# Patient Record
(demographics unavailable — no encounter records)

---

## 2024-10-08 NOTE — ASSESSMENT
[FreeTextEntry1] : Right thigh wound and hematoma improving significantly. OK to wipe down the exudate on the hip wound after each shower and with each dressing change. Continue the medihoney. Dry dressing to absorb the drainage more distally. Can use a maxipad in a pair of bike shorts for this, in order to avoid tape damage to the surrounding skin. The left nipple soreness appears to be some breast tissue enlargement, which may be related to his prostate medications. Recommend seeing his PMD for this. Pt is seeing Dr. Peguero in 2 weeks.  Follow up here in three weeks.

## 2024-10-08 NOTE — HISTORY OF PRESENT ILLNESS
[FreeTextEntry1] : Pt is here with his wife. They are using medihoney on the right hip incision and a nexcare bandaid on the old drain site. They also have a question about some soreness of his left nipple that started recently.

## 2024-10-08 NOTE — REVIEW OF SYSTEMS
[As Noted in HPI] : as noted in HPI [Negative] : Respiratory [de-identified] : Left nipple swelling and soreness

## 2024-10-08 NOTE — PHYSICAL EXAM
[de-identified] : Alert and oriented. Walking with a walker. Thin. More mobile. [de-identified] : NL respiratory effort noted  [de-identified] : Left NAC with normal color. No erythema. Some fullness of the underlying breast tissue. [de-identified] : Right thigh hematoma is quite a bit softer. Proximal wound is healing well, with granulation tissue throughout and a small amount of epithelialization medially. The thigh skin is still discolored and the skin is dry and starting to peel. there is serosanguinous drainage from the drain site and a pinpoint hole proximal to that.

## 2024-10-11 NOTE — ADDENDUM
[FreeTextEntry1] : All medical record entries made by the Scribe were at my, Dr. Piyush Seo, direction and personally dictated by me on 10/11/2024. I have reviewed the chart and agree that the record accurately reflects my personal performance of the history, physical exam, assessment and plan. I have also personally directed, reviewed, and agreed with the chart.   I, Jhony Obrien, documented this note as a scribe on behalf of Dr. Piyush Seo on 10/11/2024.

## 2024-10-11 NOTE — REASON FOR VISIT
[Follow-Up Visit] : a follow-up visit for [Blood Count Assessment] : blood count assessment [FreeTextEntry2] : Hemolytic anemia

## 2024-10-11 NOTE — HISTORY OF PRESENT ILLNESS
[de-identified] : HPI This is a 75 y/o male with pmhx of severe mitral stenosis s/p [place]. Kiel mitral valve replacement in 1999, recurrent CVAs circa 2004 with no residual deficits, PAF  (s/p ablation in 2020), HTN, hyperlipidemia, GERD, with h/o esophageal Schatzki ring, hypothyroidism, mild renal insufficiency s/p left nephrectomy for traumatic bleeding circa 2000, overactive bladder, and memory impairment. Patient had right CRISTIAN on 6/5/24 with postop course complicated by anemia requiring a total of 4 units PRBCs, swelling to right hip, maintaining INR of 2.5-3.5 with lovenox bridge to warfarin which was changed to heparin gtt bridge. patients condition improved, and was stable for discharge on 6/10/24. Patient was sent in from [facility] on 6/11 /24 due to fevers, hypotension, tachycardia, leukocytosis. Pt currently complains of right hip pain. denies chills, sob, abdominal pain, nausea, vomiting, diarrhea, calf pain, numbness/tingling, urinary symptoms  [de-identified] : Kendell presents today for follow up and is slowly improving. Still following with ID and continues on antibiotics. Has not followed w/ cardiology outpatient yet. Surgical wound is open but is healing, seeing wound care to manage. Endorses improved appetite. Notes mild edema of R leg. Denies any blood in stools.  HGB 9.1 g/dL on 10/9/24

## 2024-10-11 NOTE — PHYSICAL EXAM
[Normal] : affect appropriate [de-identified] : anicteric [de-identified] : irregular HR- a fib [de-identified] : R leg edema

## 2024-10-11 NOTE — REVIEW OF SYSTEMS
[Diarrhea: Grade 0] : Diarrhea: Grade 0 [Joint Pain] : joint pain [Joint Stiffness] : joint stiffness [Muscle Weakness] : muscle weakness [Lower Ext Edema] : lower extremity edema [Skin Wound] : skin wound [Difficulty Walking] : difficulty walking [Nosebleeds] : no nosebleeds [Cough] : no cough [Abdominal Pain] : no abdominal pain [Vomiting] : no vomiting [Constipation] : no constipation [Skin Rash] : no skin rash [Easy Bleeding] : no tendency for easy bleeding [Easy Bruising] : no tendency for easy bruising [Swollen Glands] : no swollen glands [FreeTextEntry5] : right leg edema

## 2024-10-16 NOTE — HISTORY OF PRESENT ILLNESS
[FreeTextEntry1] :  75 year old male with extensive PMHx of severe mitral stenosis s/p [place] Kiel mitral valve replacement in 1999, recurrent CVAs circa 2004 with no residual deficits, PAF (s/p ablation in 2020), hypothyroidism, mild renal insufficiency s/p left nephrectomy for traumatic bleeding circa 2000, and memory impairment presenting with fever for the past day. Pt says that at home his wife noticed that he was subjectively warm and took his temperature which was 102F. Patient has a history of right hip CRISTIAN complicated by hematoma. Patient reports continued discomfort around his right hip surgery incision site, slight chest discomfort, chills at home. Denies headache, vision changes, diarrhea, constipation, discomfort ambulating at home, dysuria, hematuria. He reports hematoma appears smaller than last hospital visit, sym pulses, sym sensation in all extremities.   [place] [facility] [place]24, for fevers, sepsis, anemia. Pt had previous right H CRISTIAN. Previous CT of right lower extremity showed large hematoma but no evidence of extravasation. s/p 7u of pRBC, s/p hematoma drainage x2. During hospital course, orthopedic team did an aspiration of right thigh hematoma, the aspirated fluid grew -enterococcus faecalis, treated with Linezolid. Pt's right thigh wound is currently being evacuated weekly by plastic surgeon Dr. Kacey Heredia.   he was last seen at my office on 8/29/2024, and the wound appeared stable he developed a fever. He was readmitted from 9/2/24 - thru 9/21/24.  Klebsiella aerogenes was identified from blood culture. Organism is susceptible to all tested agents except for cefazolin, ampicillin sulbactam, ampicillin. s/p Right hip incision and drainage with excisional debridement, chemical debridement, exchange of modular components and placement of wound VAC. on 9/6/24  On September 6, 2024 operating room - Right hip fluid cultures are reviewed. Growth of Enterococcus faecalis-susceptible to ampicillin, susceptible to vancomycin; growth of Klebsiella Aerogenes-susceptible to aztreonam, cefepime, ceftriaxone, Cipro, ertapenem, gentamicin, imipenem, levofloxacin, meropenem, piperacillin/tazobactam, tobramycin, trimethoprim/sulfamethoxazole, resistant to ampicillin, amoxicillin/clavulanic acid, ampicillin/sulbactam, cefazolin, cefoxitin; growth Pseudomonas aeruginosa-susceptible to aztreonam, cefepime, ceftazidime, ciprofloxacin, imipenem, levofloxacin.  all cultures reviewed, susceptible to Zosyn (or Ampicillin) - CONTINUE Zosyn 4.5 grams Q 8H - effective start date is 9/10/2024 Thru 10/21/2024 - weekly CBC w diff, CMP, ESR and CRP.    Patient is here for follow-up of his wife.  He still has some small incisional wound being dressed.  There is also some drainage from his prior to LIZ drain sites on the lateral right thigh.  No fevers no chills.  He is gaining some weight slowly.  His wife mentioned that he has some rash on the chest and on his upper legs.   He reports having some itching of his skin as well.

## 2024-10-16 NOTE — PHYSICAL EXAM
[General Appearance - Alert] : alert [General Appearance - In No Acute Distress] : in no acute distress [Sclera] : the sclera and conjunctiva were normal [PERRL With Normal Accommodation] : pupils were equal in size, round, reactive to light [Extraocular Movements] : extraocular movements were intact [Outer Ear] : the ears and nose were normal in appearance [Oropharynx] : the oropharynx was normal with no thrush [Neck Appearance] : the appearance of the neck was normal [Neck Cervical Mass (___cm)] : no neck mass was observed [Jugular Venous Distention Increased] : there was no jugular-venous distention [Thyroid Diffuse Enlargement] : the thyroid was not enlarged [Respiration, Rhythm And Depth] : normal respiratory rhythm and effort [Auscultation Breath Sounds / Voice Sounds] : lungs were clear to auscultation bilaterally [Heart Rate And Rhythm] : heart rate was normal and rhythm regular [Heart Sounds] : normal S1 and S2 [Full Pulse] : the pedal pulses are present [Edema] : there was no peripheral edema [Bowel Sounds] : normal bowel sounds [Abdomen Soft] : soft [Abdomen Tenderness] : non-tender [] : no hepato-splenomegaly [Abdomen Mass (___ Cm)] : no abdominal mass palpated [Costovertebral Angle Tenderness] : no CVA tenderness [No Palpable Adenopathy] : no palpable adenopathy [Musculoskeletal - Swelling] : no joint swelling [Nail Clubbing] : no clubbing  or cyanosis of the fingernails [Motor Tone] : muscle strength and tone were normal [FreeTextEntry1] : He has since a cluster of excoriations on his upper thighs bilaterally, also on his right upper chest, [Cranial Nerves] : cranial nerves 2-12 were intact [Sensation] : the sensory exam was normal to light touch and pinprick [No Focal Deficits] : no focal deficits [Oriented To Time, Place, And Person] : oriented to person, place, and time [Affect] : the affect was normal

## 2024-10-16 NOTE — PHYSICAL EXAM
[General Appearance - Well Developed] : well developed [General Appearance - Well Nourished] : well nourished [] : no respiratory distress [Abdomen Soft] : soft [Urinary Bladder Findings] : the bladder was normal on palpation [Normal Station and Gait] : the gait and station were normal for the patient's age [No Focal Deficits] : no focal deficits [Oriented To Time, Place, And Person] : oriented to person, place, and time [FreeTextEntry1] : JOANNE: small right apex 5 mm nodule felt

## 2024-10-16 NOTE — HISTORY OF PRESENT ILLNESS
[FreeTextEntry1] :  75 year old male with extensive PMHx of severe mitral stenosis s/p [place] Kiel mitral valve replacement in 1999, recurrent CVAs circa 2004 with no residual deficits, PAF (s/p ablation in 2020), hypothyroidism, mild renal insufficiency s/p left nephrectomy for traumatic bleeding circa 2000, and memory impairment presenting with fever for the past day. Pt says that at home his wife noticed that he was subjectively warm and took his temperature which was 102F. Patient has a history of right hip CRISTIAN complicated by hematoma. Patient reports continued discomfort around his right hip surgery incision site, slight chest discomfort, chills at home. Denies headache, vision changes, diarrhea, constipation, discomfort ambulating at home, dysuria, hematuria. He reports hematoma appears smaller than last hospital visit, sym pulses, sym sensation in all extremities.   Last hospital admission to [facility] [place] was from 7/16-8/8/24, for fevers, sepsis, anemia. Pt had previous right H CRISTIAN. Previous CT of right lower extremity showed large hematoma but no evidence of extravasation. s/p 7u of pRBC, s/p hematoma drainage x2. During hospital course, orthopedic team did an aspiration of right thigh hematoma, the aspirated fluid grew -enterococcus faecalis, treated with Linezolid. Pt's right thigh wound is currently being evacuated weekly by plastic surgeon Dr. Kacey Heredia.   he was last seen at my office on 8/29/2024, and the wound appeared stable he developed a fever. He was readmitted from 9/2/24 - thru 9/21/24.  Klebsiella aerogenes was identified from blood culture. Organism is susceptible to all tested agents except for cefazolin, ampicillin sulbactam, ampicillin. s/p Right hip incision and drainage with excisional debridement, chemical debridement, exchange of modular components and placement of wound VAC. on 9/6/24  On September 6, 2024 operating room - Right hip fluid cultures are reviewed. Growth of Enterococcus faecalis-susceptible to ampicillin, susceptible to vancomycin; growth of Klebsiella Aerogenes-susceptible to aztreonam, cefepime, ceftriaxone, Cipro, ertapenem, gentamicin, imipenem, levofloxacin, meropenem, piperacillin/tazobactam, tobramycin, trimethoprim/sulfamethoxazole, resistant to ampicillin, amoxicillin/clavulanic acid, ampicillin/sulbactam, cefazolin, cefoxitin; growth Pseudomonas aeruginosa-susceptible to aztreonam, cefepime, ceftazidime, ciprofloxacin, imipenem, levofloxacin.  all cultures reviewed, susceptible to Zosyn (or Ampicillin) - CONTINUE Zosyn 4.5 grams Q 8H - effective start date is 9/10/2024 Thru 10/21/2024 - weekly CBC w diff, CMP, ESR and CRP.    Patient is here for follow-up of his wife.  He still has some small incisional wound being dressed.  There is also some drainage from his prior to LIZ drain sites on the lateral right thigh.  No fevers no chills.  He is gaining some weight slowly.  His wife mentioned that he has some rash on the chest and on his upper legs.   He reports having some itching of his skin as well.

## 2024-10-16 NOTE — REVIEW OF SYSTEMS
[As Noted in HPI] : as noted in HPI [Skin Lesions] : skin lesion [Skin Wound] : skin wound [Itching] : itching [Negative] : Heme/Lymph

## 2024-10-16 NOTE — ASSESSMENT
[FreeTextEntry1] : r/o OAB due to BPH ++ Fam Hx PCA + JOANNE PSA 3.5 Jan 2024: 4K score = 4.2 / PSA=3.89 / MRI = PS=32cc / 2 lesions: PIRADS 3 right entire transverse plane / left anterior apex / neg SV / neg LN/neg NVB PVR=29cc / patient has hip replacement coming up. also he is on coumadin for a mechanical heart valve.  July 2024: PSA=2.11 / is s/p hip replacement - now with LUTS, frequency in urination and severe nocturia 7 x per night - PVR office = 82 cc October 2024: had a hip replacement in June 2024 - has had complications after that, had a hematoma after surgery. currently on IV antibiotics at home.  sx on oxybutynin 5 mg BID and Flomax 0.4 QHS - no improvement at all. his ONLY problem is nocturia, but during the day, he doesn't urinate often and doesn't have urgency. He does have a hx of SUZANNE but could never tolerate a CPAP. office PVR=30 cc / POC urine: clean no infection  Plan make oxybutynin 10 mg ER QHS only keep flomax 0.4 all recs/guidelines for nocturia given see sleep specialist for CPAP and nocturia

## 2024-10-16 NOTE — HISTORY OF PRESENT ILLNESS
[FreeTextEntry1] : 74 yo uninephric s/p Left nephrectomy 2000, MVR, HTN, has SUZANNE but never used CPAP machine, patient with a pertinent past medical history of who presents to clinic today with complaints of LUTS.  Fam Hx: cousin and brother with PCa nocturia is the most bothersome symptom. Wakes up 5 x /night to use bathroom. Quality of his stream is good  PSA=3.54 - has a hx of prostate bx 2 years ago at [facility], was negative. SCr=1.1 IPSS: 15 / MARGARETH=9 / office PVR=19 mL Jan 2024: 4K score = 4.2 / PSA=3.89 / MRI = PS=32 cc / 2 lesions: PIRADS 3 right entire transverse plane / left anterior apex / neg SV / neg LN/neg NVB PVR=29cc / patient has hip replacement coming up. also he is on Coumadin for a mechanical heart valve.   July 2024: PSA=2.11 / is s/p hip replacement - now with LUTS, frequency in urination and severe nocturia 7 x per night - PVR office = 82 cc  October 2024: had a hip replacement in June 2024 - has had complications after that, had a hematoma after surgery. currently on IV antibiotics at home.  sx on oxybutynin 5 mg BID and Flomax 0.4 QHS - no improvement at all. his ONLY problem is nocturia, but during the day, he doesn't urinate often and doesn't have urgency. He does have a hx of SUZANNE but could never tolerate a CPAP. office PVR=30 cc / POC urine: clean no infection

## 2024-10-16 NOTE — DATA REVIEWED
[FreeTextEntry1] : Labs reviewed on [facility].  Most recent 10/9/2024 ESR of 25, C-reactive protein 15 which is decreased from 10/2/2024.

## 2024-10-16 NOTE — DATA REVIEWED
[FreeTextEntry1] : Labs reviewed on [facility].  Most recent 10/9/2024 ESR of 25, C-reactive protein 15 which is decreased from 10/2/2024.

## 2024-10-16 NOTE — ASSESSMENT
[FreeTextEntry1] : This 75-year-old man with history of [place] Kiel's mechanical valve, on long-term anticoagulation, a prior right total hip arthroplasty in June 2024 complicated by hematoma, leading to admissions in July through August 2024.  He went back to the OR for evaluation on September 6, 2024, leading to incision and drainage and excisional debridement, chemical debridement and exchange of modular components.  Fluid cultures from the right hip at that time grew out Enterococcus faecalis, Klebsiella aerogenous, and Pseudomonas aeruginosa.  He is currently on Zosyn 4.5 g every 8 hours until October 21, 2024 (start date was 9/10/2024).  After these antibiotics are done, I plan to start him on the following: Augmentin 875 mg by mouth twice a day, Levaquin 750 mg by mouth daily.  Aware of the side effect profiles of both medications, aware of his rash developing while he is on Zosyn.  The rash may be from an allergic reaction from the Zosyn.  I explained to the patient that given that he has multiple bacterial entities found on his joint fluid cultures, we do not have a singular antibiotic that could help suppress these organisms.  Continue wound care as ordered by the orthopedics team, Dr. Albrecht.  I have explained that if his numbers continue to improve (ESR/CRP/WBC) in the upcoming months, there is a chance that I can reduce the dosage of the Augmentin and the Levaquin.  He understands.  I gave them a handout regarding probiotic rich foods/fermented foods that might be helpful in supporting his intestinal health while he is on these antibiotics.  He understands.  He will have a follow-up here in about 1 month, labs to be done a few days prior to next visit.

## 2024-11-03 NOTE — ASSESSMENT
[FreeTextEntry1] : 74 y/o male for follow up   Fat necrosis and drainage of fluid is expressed from right groin Follow up in 2 weeks. All questions and concerns addressed. Plan and patient status as per Dr Heredia.

## 2024-11-03 NOTE — HISTORY OF PRESENT ILLNESS
[FreeTextEntry1] : Patient presents for follow up and states that he is having much less drainage from the areas that were draining before.  He also states that he is not using the medihoney on his wounds at all as instructed.  He states that he has less swelling of the leg and less discomfort. He continues to take Augmentin and Levaquin.  He denies fever, chills, redness or other signs of infection.

## 2024-11-03 NOTE — ADDENDUM
[FreeTextEntry1] : All medical record entries were at my direction and personally dictated by me (Dr. Kacey Heredia). I have reviewed the chart and agree that the record accurately reflects my personal performance of the history, physical exam, assessment and plan. Residual hematoma and fat necrosis noted, now that the wounds have stopped draining.  This may need to be drained/released from time to time until the collection has resolved.

## 2024-11-03 NOTE — PHYSICAL EXAM
[NI] : Normal [de-identified] : Right thigh hematoma is softer.  The skin is dark and dry and feels leathery in texture. There is a palpable collection/area of fat necrosis with a tract up to the groin area where there is a blister.   The blister is unroofed with an 18 ga needle after CHG prep, and fat necrosis and oils are released and expressed from the area.   The old drain site and one other lesion have no drainage on the dressings that are in place.

## 2024-11-03 NOTE — ASSESSMENT
[FreeTextEntry1] : 76 y/o male for follow up   Fat necrosis and drainage of fluid is expressed from right groin Follow up in 2 weeks. All questions and concerns addressed. Plan and patient status as per Dr Heredia.

## 2024-11-03 NOTE — ADDENDUM
I tried calling mom and left her a message that Dr. Crisostomo said she should try to stay in the state for his neurology.  I left her a message since she didn't answer.  I will not be here tomorrow so if she calls.  He said we can try someone in [facility] or [place].  We need to try to stay in the state because of insurance.   [FreeTextEntry1] : All medical record entries were at my direction and personally dictated by me (Dr. Kacey Heredia). I have reviewed the chart and agree that the record accurately reflects my personal performance of the history, physical exam, assessment and plan. Residual hematoma and fat necrosis noted, now that the wounds have stopped draining.  This may need to be drained/released from time to time until the collection has resolved.

## 2024-11-03 NOTE — PHYSICAL EXAM
[NI] : Normal [de-identified] : Right thigh hematoma is softer.  The skin is dark and dry and feels leathery in texture. There is a palpable collection/area of fat necrosis with a tract up to the groin area where there is a blister.   The blister is unroofed with an 18 ga needle after CHG prep, and fat necrosis and oils are released and expressed from the area.   The old drain site and one other lesion have no drainage on the dressings that are in place.

## 2024-11-07 NOTE — PHYSICAL EXAM
[Ambulatory and capable of all self care but unable to carry out any work activities] : Status 2- Ambulatory and capable of all self care but unable to carry out any work activities. Up and about more than 50% of waking hours [Thin] : thin [Normal] : affect appropriate [de-identified] : anicteric [de-identified] : irregular HR- a fib [de-identified] : R leg edema 1+ pitting [de-identified] : altered gait- uses cane

## 2024-11-07 NOTE — RESULTS/DATA
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

## 2024-11-07 NOTE — REVIEW OF SYSTEMS
[Chills] : no chills [Fatigue] : no fatigue [Nosebleeds] : no nosebleeds [Palpitations] : no palpitations [Lower Ext Edema] : lower extremity edema [Cough] : no cough [Abdominal Pain] : no abdominal pain [Vomiting] : no vomiting [Constipation] : no constipation [Diarrhea: Grade 0] : Diarrhea: Grade 0 [Joint Pain] : joint pain [Joint Stiffness] : joint stiffness [Muscle Weakness] : muscle weakness [Skin Rash] : no skin rash [Skin Wound] : skin wound [Difficulty Walking] : difficulty walking [Easy Bleeding] : no tendency for easy bleeding [Easy Bruising] : no tendency for easy bruising [Swollen Glands] : no swollen glands [FreeTextEntry5] : right leg edema [de-identified] : healing

## 2024-11-07 NOTE — REASON FOR VISIT
[Follow-Up Visit] : a follow-up visit for [Blood Count Assessment] : blood count assessment [Spouse] : spouse [FreeTextEntry2] : Hemolytic anemia

## 2024-11-07 NOTE — HISTORY OF PRESENT ILLNESS
[de-identified] : HPI This is a 73 y/o male with pmhx of severe mitral stenosis s/p [place]. Kiel mitral valve replacement in 1999, recurrent CVAs circa 2004 with no residual deficits, PAF  (s/p ablation in 2020), HTN, hyperlipidemia, GERD, with h/o esophageal Schatzki ring, hypothyroidism, mild renal insufficiency s/p left nephrectomy for traumatic bleeding circa 2000, overactive bladder, and memory impairment. Patient had right CRISTIAN on 6/5/24 with postop course complicated by anemia requiring a total of 4 units PRBCs, swelling to right hip, maintaining INR of 2.5-3.5 with lovenox bridge to warfarin which was changed to heparin gtt bridge. patients condition improved, and was stable for discharge on 6/10/24. Patient was sent in from [facility] on 6/11 /24 due to fevers, hypotension, tachycardia, leukocytosis. Pt currently complains of right hip pain. denies chills, sob, abdominal pain, nausea, vomiting, diarrhea, calf pain, numbness/tingling, urinary symptoms  [de-identified] : Kendell presents today for follow up. Wife reports he had a fall last week, went to ED for CT scan of head which was (-). Still following with ID and continues on antibiotics.  HGB 11.8 today

## 2024-11-13 NOTE — HISTORY OF PRESENT ILLNESS
[FreeTextEntry1] : A 75-year-old male with a complex medical history including mitral valve replacement, remote CVAs, atrial fibrillation ablation, hypothyroidism, mild renal insufficiency after nephrectomy, and memory impairment presents with a one-day history of fever. He also reports continued discomfort around his right hip incision site from a recent CRISTIAN complicated by a recurrent infected hematoma. He was recently hospitalized twice for this issue: once in July/August for fevers, sepsis, and anemia requiring blood transfusions and hematoma drainage (treated with linezolid for Enterococcus faecalis in the aspirate), and again in September for Klebsiella aerogenes and Enterococcus faecalis bacteremia requiring surgical debridement and component exchange. Cultures from the September I&D also grew Pseudomonas aeruginosa. He was discharged on a prolonged course of Zosyn.   During the October 16, 2024 visit, he was afebrile and without chills, had some residual drainage from the right thigh, and had a n ew rash on his chest and upper legs.  He was to completed Zosyn 4.5 g every 8 hours until October 21, 2024 (start date was 9/10/2024). Then transition to: Augmentin 875 mg by mouth twice a day, Levaquin 750 mg by mouth daily.  The rash may be from an allergic reaction from the Zosyn. I explained to the patient that given that he has multiple bacterial entities found on his joint fluid cultures, we do not have a singular antibiotic that could help suppress these organisms. Continue wound care as ordered by the orthopedics team, Dr. Albrecht.  I have explained that if his numbers continue to improve (ESR/CRP/WBC) in the upcoming months, there is a chance that I can reduce the dosage of the Augmentin and the Levaquin.  The last visit, patient is eating better.  He is here with his wife.  He is gaining some weight and sleeping little bit better.  It still takes a lot of energy to move.  But he is moving about.  He has no diarrhea, he is moving his bowels.  They did take some of the recommended foods that are probiotic rich including sauerkraut and yogurt and pickles.  He still has some rash on his back using some oral steroid cream at home.

## 2024-11-13 NOTE — PHYSICAL EXAM
[NI] : Normal [de-identified] : Right thigh hematoma is softer.  The skin is dark and dry and feels leathery in texture. There is a palpable collection/area of fat necrosis with a track but it is much smaller and no longer communicates with the skin. There is no "blister" or bubble at the scar.

## 2024-11-13 NOTE — ASSESSMENT
[FreeTextEntry1] : Right thigh wound is closing without any sign of infection. Continue current care. Follow up in one month In the meantime, pt was given some chair exercises for his quads and balance to do several time per day.

## 2024-11-13 NOTE — HISTORY OF PRESENT ILLNESS
[FreeTextEntry1] : Pt is here with his wife. He had been doing pretty well, but then he fell and bumped his head. No drainage for the past few days. No fevers or chills.

## 2024-11-13 NOTE — ASSESSMENT
[FreeTextEntry1] :   This 75-year-old man with history of Saint Kiel's mechanical valve, on long-term anticoagulation, a prior right total hip arthroplasty in June 2024 complicated by hematoma, leading to admissions in July through August 2024.  He went back to the OR for evaluation on September 6, 2024, leading to incision and drainage and excisional debridement, chemical debridement and exchange of modular components. Fluid cultures from the right hip at that time grew out Enterococcus faecalis, Klebsiella aerogenes, and Pseudomonas aeruginosa.  He has completed his course of Zosyn.  Currently he is still on suppressive oral antibiotics:  Augmentin 875 mg by mouth twice a day, Levaquin 750 mg by mouth daily.   He still has some itching and some papules on the lower back which may be a reaction to the amoxicillin. His most recent labs show improvement and normalization of ESR CRP and WBC.  At the current time I expressed that it would still be a good idea to take the Augmentin twice a day with the Levaquin.  If his rash worsens or the pruritus becomes unbearable, we can consider taking him off the Augmentin completely.  He should follow-up in around January, third week.  At that time we will plan to consider decreasing his Augmentin to 875 mg once a day, and his Levaquin to 500 mg once a day.  I have arranged for him to get labs in November, and December.  I have continued to encouraged him to take probiotics and fermented foods as this may help populate the gut with good anastacio.

## 2024-11-13 NOTE — PHYSICAL EXAM
[General Appearance - Alert] : alert [General Appearance - In No Acute Distress] : in no acute distress [Sclera] : the sclera and conjunctiva were normal [PERRL With Normal Accommodation] : pupils were equal in size, round, reactive to light [Extraocular Movements] : extraocular movements were intact [Outer Ear] : the ears and nose were normal in appearance [Oropharynx] : the oropharynx was normal with no thrush [Neck Appearance] : the appearance of the neck was normal [Neck Cervical Mass (___cm)] : no neck mass was observed [Jugular Venous Distention Increased] : there was no jugular-venous distention [Thyroid Diffuse Enlargement] : the thyroid was not enlarged [Auscultation Breath Sounds / Voice Sounds] : lungs were clear to auscultation bilaterally [Heart Rate And Rhythm] : heart rate was normal and rhythm regular [Heart Sounds Gallop] : no gallops [Full Pulse] : the pedal pulses are present [Edema] : there was no peripheral edema [Bowel Sounds] : normal bowel sounds [Abdomen Soft] : soft [Abdomen Tenderness] : non-tender [] : no hepato-splenomegaly [Abdomen Mass (___ Cm)] : no abdominal mass palpated [Costovertebral Angle Tenderness] : no CVA tenderness [No Palpable Adenopathy] : no palpable adenopathy [Musculoskeletal - Swelling] : no joint swelling [Nail Clubbing] : no clubbing  or cyanosis of the fingernails [Motor Tone] : muscle strength and tone were normal [Skin Color & Pigmentation] : normal skin color and pigmentation [Cranial Nerves] : cranial nerves 2-12 were intact [Sensation] : the sensory exam was normal to light touch and pinprick [No Focal Deficits] : no focal deficits [Oriented To Time, Place, And Person] : oriented to person, place, and time [Affect] : the affect was normal [FreeTextEntry1] : Several erythematous papules on the lower back

## 2024-12-12 NOTE — ASSESSMENT
[FreeTextEntry1] : 74 y/o male for follow up   Healing well Plan is to follow up in February. Patient is advised to elevate the right ankle above the level of his knee as much as possible. He is also encouraged to engage his quads and perform leg lifts while seated. All questions and concerns addressed. Plan and patient status as per Dr Heredia.

## 2024-12-12 NOTE — HISTORY OF PRESENT ILLNESS
[FreeTextEntry1] : Patient presents with his wife and states that he is feeing better and that his incisions are all closed.  There has been no drainage or need for bandages at this time.  He states that he is going to PT and getting stronger.  His wife also states that his right lower extremity gets swollen quite often as he is not elevating it as he should.  He has no other complaints.

## 2024-12-12 NOTE — ADDENDUM
[FreeTextEntry1] : All medical record entries were at my direction and personally dictated by me (Dr. Kacey Heredia). I have reviewed the chart and agree that the record accurately reflects my personal performance of the history, physical exam, assessment and plan.

## 2024-12-12 NOTE — PHYSICAL EXAM
[NI] : Normal [de-identified] : Sequelae of the right thigh hematoma is noted with continued firmness and hyperpigmentation in the area.  There is no palpable hematoma, no track, no leakage of fluid, no warmth, no tenderness. The surrounding skin is softer than last visit and there is improved quad strength with the ability to straight leg raise.

## 2024-12-12 NOTE — REASON FOR VISIT
[Follow-Up: _____] : a [unfilled] follow-up visit [FreeTextEntry1] : Patient presents today for a check up on his hip wound. Patient states the wound is healing great and is almost completely healed. No concerns at this time

## 2024-12-12 NOTE — PHYSICAL EXAM
[NI] : Normal [de-identified] : Sequelae of the right thigh hematoma is noted with continued firmness and hyperpigmentation in the area.  There is no palpable hematoma, no track, no leakage of fluid, no warmth, no tenderness. The surrounding skin is softer than last visit and there is improved quad strength with the ability to straight leg raise.

## 2025-01-15 NOTE — HISTORY OF PRESENT ILLNESS
[FreeTextEntry1] : A 75-year-old male with a complex medical history including mitral valve replacement, remote CVAs, atrial fibrillation ablation, hypothyroidism, mild renal insufficiency after nephrectomy, and memory impairment presents with a one-day history of fever. He also reports continued discomfort around his right hip incision site from a recent CRISTIAN complicated by a recurrent infected hematoma. He was recently hospitalized twice for this issue: once in July/August for fevers, sepsis, and anemia requiring blood transfusions and hematoma drainage (treated with linezolid for Enterococcus faecalis in the aspirate), and again in September for Klebsiella aerogenes and Enterococcus faecalis bacteremia requiring surgical debridement and component exchange. Cultures from the September I&D also grew Pseudomonas aeruginosa. He was discharged on a prolonged course of Zosyn.   During the October 16, 2024 visit, he was afebrile and without chills, had some residual drainage from the right thigh, and had a n ew rash on his chest and upper legs.  He was to completed Zosyn 4.5 g every 8 hours until October 21, 2024 (start date was 9/10/2024). Then transition to: Augmentin 875 mg by mouth twice a day, Levaquin 750 mg by mouth daily.  The rash may be from an allergic reaction from the Zosyn. I explained to the patient that given that he has multiple bacterial entities found on his joint fluid cultures, we do not have a singular antibiotic that could help suppress these organisms. Continue wound care as ordered by the orthopedics team, Dr. Albrecht.  I have explained that if his numbers continue to improve (ESR/CRP/WBC) in the upcoming months, there is a chance that I can reduce the dosage of the Augmentin and the Levaquin.  The last visit, patient is eating better.  He is here with his wife.  He is gaining some weight and sleeping little bit better.  It still takes a lot of energy to move.  But he is moving about.  He has no diarrhea, he is moving his bowels.  They did take some of the recommended foods that are probiotic rich including sauerkraut and yogurt and pickles.  He still has some rash on his back using some steroid cream at home.  Since the last visit, he still has some rash.   He is taking the Augmentin twice a day and Levaquin once a day.  On review of his labs, his ESR 6, CRP is <3. Labs were scanned into the computer, and mentioned in the note here.

## 2025-01-15 NOTE — ASSESSMENT
[FreeTextEntry1] : This 75-year-old man with history of Saint Kiel's mechanical valve, on long-term anticoagulation, a prior right total hip arthroplasty in June 2024 complicated by hematoma, leading to admissions in July through August 2024.  He went back to the OR for evaluation on September 6, 2024, leading to incision and drainage and excisional debridement, chemical debridement and exchange of modular components. Fluid cultures from the right hip at that time grew out Enterococcus faecalis, Klebsiella aerogenes, and Pseudomonas aeruginosa.  He has completed his course of Zosyn.  Currently he is still on suppressive oral antibiotics:     He still has some itching and some papules on the lower back which may be a reaction to the amoxicillin. His most recent labs shows normal ESR normal CRP.  This has been consistent for a few measures.  At the current time, with his rash, I suspect that he is having amoxicillin reaction.  Will stop the amoxicillin.  Continue taking once a day Levaquin 500 milligrams.

## 2025-02-12 NOTE — ASSESSMENT
[FreeTextEntry1] : 74 y/o male for follow up   Significant improvement in strength of right thigh. Plan is to follow up as needed in the future as there is no longer a need for wound care visits. Patient is encouraged to continue to go to PT and keep a home journal of daily exercises as this will help him retain his strength and continue to get stronger.  Patient is also advised to see dermatologist and allergist for new rash on bilateral forearms.  Plan as per Dr Heredia.  All questions and concerns addressed.

## 2025-02-12 NOTE — HISTORY OF PRESENT ILLNESS
[FreeTextEntry1] : Patient presents with his wife and states that he is feeling better and that his incisions are healed, and his right thigh is softer.  He states that he is going to PT and getting stronger.  He has no complaints today.

## 2025-02-12 NOTE — PHYSICAL EXAM
[NI] : Normal [de-identified] : Walking with single prong cane [de-identified] : Sequelae of the right thigh hematoma has significantly improved with the thigh soft and not tense. Thigh continues to be dark in color, but slightly less than last visit. There is no palpable hematoma, no track, no leakage of fluid, no warmth, no tenderness. The surrounding skin is softer than last visit and there is significant improvement of quad strength with the ability to straight leg raise and extend at the knee and hip. Bilateral forearms with a macular-papular rash, erythematous and irritated.  No edema or hives noted.

## 2025-02-12 NOTE — REASON FOR VISIT
[Post Hospitalization] : a post hospitalization visit [Spouse] : spouse [FreeTextEntry1] : he has little pain on right leg

## 2025-02-12 NOTE — ADDENDUM
[FreeTextEntry1] : All medical record entries were at my direction and personally dictated by me (Dr. Kacey Heredia). I have reviewed the chart and agree that the record accurately reflects my personal performance of the history, physical exam, assessment and plan. Markedly improved. Follow up as needed.

## 2025-02-14 NOTE — ADDENDUM
[FreeTextEntry1] : All medical record entries made by the Scribe were at my, Dr. Piyush Seo, direction and personally dictated by me on 02/14/2025. I have reviewed the chart and agree that the record accurately reflects my personal performance of the history, physical exam, assessment and plan. I have also personally directed, reviewed, and agreed with the chart.   I, Jhony Obrien, documented this note as a scribe on behalf of Dr. Piyush Seo on 02/14/2025.

## 2025-02-14 NOTE — HISTORY OF PRESENT ILLNESS
[de-identified] : HPI This is a 75 y/o male with pmhx of severe mitral stenosis s/p St. Kiel mitral valve replacement in 1999, recurrent CVAs circa 2004 with no residual deficits, PAF  (s/p ablation in 2020), HTN, hyperlipidemia, GERD, with h/o esophageal Schatzki ring, hypothyroidism, mild renal insufficiency s/p left nephrectomy for traumatic bleeding circa 2000, overactive bladder, and memory impairment. Patient had right CRISTIAN on 6/5/24 with postop course complicated by anemia requiring a total of 4 units PRBCs, swelling to right hip, maintaining INR of 2.5-3.5 with lovenox bridge to warfarin which was changed to heparin gtt bridge. patients condition improved, and was stable for discharge on 6/10/24. Patient was sent in from [facility] on 6/11 /24 due to fevers, hypotension, tachycardia, leukocytosis. Pt currently complains of right hip pain. denies chills, sob, abdominal pain, nausea, vomiting, diarrhea, calf pain, numbness/tingling, urinary symptoms  [de-identified] : He returns today for a follow up.  States he is doing well overall, last Procrit given 10/21/24. Reports improvement of R leg hematoma, still lingering weakness but undergoing PT and getting stronger. Endorses good appetite. Denies active or recent bleeding.  Today's CBC: WBC 6.67 K/uL, HGB 10.6 g/dL, HCT 31.7%,  K/uL

## 2025-02-14 NOTE — PHYSICAL EXAM
[Ambulatory and capable of all self care but unable to carry out any work activities] : Status 2- Ambulatory and capable of all self care but unable to carry out any work activities. Up and about more than 50% of waking hours [Thin] : thin [Normal] : no JVD, no calf tenderness, venous stasis changes, varices [de-identified] : anicteric [de-identified] : irregular HR- a fib [de-identified] : altered gait- uses cane

## 2025-02-14 NOTE — REVIEW OF SYSTEMS
[Lower Ext Edema] : lower extremity edema [Diarrhea: Grade 0] : Diarrhea: Grade 0 [Joint Pain] : joint pain [Joint Stiffness] : joint stiffness [Muscle Weakness] : muscle weakness [Skin Wound] : skin wound [Difficulty Walking] : difficulty walking [Chills] : no chills [Fatigue] : no fatigue [Nosebleeds] : no nosebleeds [Palpitations] : no palpitations [Cough] : no cough [Abdominal Pain] : no abdominal pain [Vomiting] : no vomiting [Constipation] : no constipation [Skin Rash] : no skin rash [Easy Bleeding] : no tendency for easy bleeding [Easy Bruising] : no tendency for easy bruising [Swollen Glands] : no swollen glands [FreeTextEntry5] : right leg edema [de-identified] : healing

## 2025-04-16 NOTE — ASSESSMENT
[FreeTextEntry1] : r/o OAB due to BPH ++ Fam Hx PCA + JOANNE PSA 3.5 Jan 2024: 4K score = 4.2 / PSA=3.89 / MRI = PS=32cc / 2 lesions: PIRADS 3 right entire transverse plane / left anterior apex / neg SV / neg LN/neg NVB PVR=29cc / patient has hip replacement coming up. also he is on coumadin for a mechanical heart valve.  July 2024: PSA=2.11 / is s/p hip replacement - now with LUTS, frequency in urination and severe nocturia 7 x per night - PVR office = 82 cc October 2024: had a hip replacement in June 2024 - has had complications after that, had a hematoma after surgery. currently on IV antibiotics at home.  sx on oxybutynin 5 mg BID and Flomax 0.4 QHS - no improvement at all. his ONLY problem is nocturia, but during the day, he doesn't urinate often and doesn't have urgency. He does have a hx of SUZANNE but could never tolerate a CPAP. office PVR=30 cc / POC urine: clean no infection  Plan make oxybutynin 10 mg ER QHS only keep Flomax 0.4 RTC 6 months for PVR  All images and labs were reviewed and explained thoroughly All the patient's questions were answered to the best of my ability.

## 2025-04-16 NOTE — HISTORY OF PRESENT ILLNESS
[FreeTextEntry1] : 74 yo uninephric s/p Left nephrectomy 2000, MVR, HTN, has SUZANNE but never used CPAP machine, patient with a pertinent past medical history of who presents to clinic today with complaints of LUTS.  Fam Hx: cousin and brother with PCa nocturia is the most bothersome symptom. Wakes up 5 x /night to use bathroom. Quality of his stream is good  PSA=3.54 - has a hx of prostate bx 2 years ago at [facility], was negative. SCr=1.1 IPSS: 15 / MARGARETH=9 / office PVR=19 mL Jan 2024: 4K score = 4.2 / PSA=3.89 / MRI = PS=32 cc / 2 lesions: PIRADS 3 right entire transverse plane / left anterior apex / neg SV / neg LN/neg NVB PVR= 29cc / patient has hip replacement coming up. also he is on Coumadin for a mechanical heart valve.  July 2024: PSA=2.11 / is s/p hip replacement - now with LUTS, frequency in urination and severe nocturia 7 x per night - PVR office = 82 cc  October 2024: had a hip replacement in June 2024 - has had complications after that, had a hematoma after surgery. currently on IV antibiotics at home.  sx on oxybutinin 5 mg BID and Flomax 0.4 QHS - no improvement at all. his ONLY problem is nocturia, but during the day, he doesn't urinate often and doesn't have urgency. He does have a hx of SUZANNE but could never tolerate a CPAP. office PVR=30 cc / POC urine: clean no infection April 2025: sx on oxybutynin 10 mg + Flomax 0.4 mg: doing very well on them. only 2x nocturia per night. / PSA=1.83

## 2025-04-23 NOTE — PHYSICAL EXAM
[General Appearance - Alert] : alert [General Appearance - In No Acute Distress] : in no acute distress [Sclera] : the sclera and conjunctiva were normal [PERRL With Normal Accommodation] : pupils were equal in size, round, reactive to light [Extraocular Movements] : extraocular movements were intact [Outer Ear] : the ears and nose were normal in appearance [Oropharynx] : the oropharynx was normal with no thrush [Neck Appearance] : the appearance of the neck was normal [Neck Cervical Mass (___cm)] : no neck mass was observed [Jugular Venous Distention Increased] : there was no jugular-venous distention [Thyroid Diffuse Enlargement] : the thyroid was not enlarged [Auscultation Breath Sounds / Voice Sounds] : lungs were clear to auscultation bilaterally [Heart Rate And Rhythm] : heart rate was normal and rhythm regular [Heart Sounds Gallop] : no gallops [Full Pulse] : the pedal pulses are present [Edema] : there was no peripheral edema [Bowel Sounds] : normal bowel sounds [Abdomen Soft] : soft [Abdomen Tenderness] : non-tender [] : no hepato-splenomegaly [Abdomen Mass (___ Cm)] : no abdominal mass palpated [Costovertebral Angle Tenderness] : no CVA tenderness [No Palpable Adenopathy] : no palpable adenopathy [Musculoskeletal - Swelling] : no joint swelling [Nail Clubbing] : no clubbing  or cyanosis of the fingernails [Motor Tone] : muscle strength and tone were normal [Skin Color & Pigmentation] : normal skin color and pigmentation [Cranial Nerves] : cranial nerves 2-12 were intact [Sensation] : the sensory exam was normal to light touch and pinprick [No Focal Deficits] : no focal deficits [Oriented To Time, Place, And Person] : oriented to person, place, and time [Affect] : the affect was normal [FreeTextEntry1] : Well-healed incisions on the right upper thigh. There is some ecchymosis and some hemosiderin staining on the lateral right thigh. some fullness and induration to the lateral aspect of the right upper thigh.

## 2025-04-23 NOTE — HISTORY OF PRESENT ILLNESS
[FreeTextEntry1] : This 75-year-old man with history of Saint Kiel's mechanical valve, on long-term anticoagulation, a prior right total hip arthroplasty in June 2024 complicated by hematoma, leading to admissions in July through August 2024. He went back to the OR for evaluation on September 6, 2024, leading to incision and drainage and excisional debridement, chemical debridement and exchange of modular components. Fluid cultures from the right hip at that time grew out Enterococcus faecalis, Klebsiella aerogenes, and Pseudomonas aeruginosa. He has completed his course of Zosyn.   He was last seen in Jan 2025; at that time, he was on Amoxicillin and Levaquin, and developed what appeared to be an allergic skin rash. attributed to the Amoxicillin.  amoxicillin was stopped and he was instructed to Continue taking once a day Levaquin 500 milligrams.   Patient is here for follow-up.  Overall, he feels much better.  He still has a lot of tenderness, in his right thigh especially when he is rolling over or bumping it by accident.  The hematoma is much smaller.  He still has some dark discoloration.  His wife, Tegan, reports that he does need to have good-looking legs for the beach.

## 2025-04-23 NOTE — ASSESSMENT
[FreeTextEntry1] : Chronic right hip infection.  - treated with IV antibiotics.  now on chronic suppression  - continue Levaquin take daily.   During the next visit, we will discuss about reduced dose of Levaquin once a day.  Will check some repeat labs including inflammatory markers.

## 2025-05-09 NOTE — PHYSICAL EXAM
[Ambulatory and capable of all self care but unable to carry out any work activities] : Status 2- Ambulatory and capable of all self care but unable to carry out any work activities. Up and about more than 50% of waking hours [Thin] : thin [Normal] : affect appropriate [de-identified] : anicteric [de-identified] : irregular HR- a fib [de-identified] : altered gait- uses cane

## 2025-05-09 NOTE — HISTORY OF PRESENT ILLNESS
[de-identified] : HPI This is a 75 y/o male with pmhx of severe mitral stenosis s/p [place] mitral valve replacement in 1999, recurrent CVAs circa 2004 with no residual deficits, PAF  (s/p ablation in 2020), HTN, hyperlipidemia, GERD, with h/o esophageal Schatzki ring, hypothyroidism, mild renal insufficiency s/p left nephrectomy for traumatic bleeding circa 2000, overactive bladder, and memory impairment. Patient had right CRISTIAN on 6/5/24 with postop course complicated by anemia requiring a total of 4 units PRBCs, swelling to right hip, maintaining INR of 2.5-3.5 with lovenox bridge to warfarin which was changed to heparin gtt bridge. patients condition improved, and was stable for discharge on 6/10/24. Patient was sent in from [facility] on 6/11 /24 due to fevers, hypotension, tachycardia, leukocytosis. Pt currently complains of right hip pain. denies chills, sob, abdominal pain, nausea, vomiting, diarrhea, calf pain, numbness/tingling, urinary symptoms  [de-identified] : He returns today for a follow up. Still on Levaquin- following with ID  States he is doing well overall, last Procrit given 2/2025. Denies active or recent bleeding.

## 2025-05-09 NOTE — REVIEW OF SYSTEMS
[Chills] : no chills [Fatigue] : no fatigue [Nosebleeds] : no nosebleeds [Palpitations] : no palpitations [Lower Ext Edema] : lower extremity edema [Cough] : no cough [Abdominal Pain] : no abdominal pain [Vomiting] : no vomiting [Constipation] : no constipation [Diarrhea: Grade 0] : Diarrhea: Grade 0 [Joint Pain] : joint pain [Joint Stiffness] : joint stiffness [Muscle Weakness] : muscle weakness [Skin Rash] : no skin rash [Skin Wound] : no skin wound [Difficulty Walking] : difficulty walking [Anxiety] : no anxiety [Easy Bleeding] : no tendency for easy bleeding [Easy Bruising] : no tendency for easy bruising [Swollen Glands] : no swollen glands [[email or web address]] : right leg edema